# Patient Record
Sex: MALE | Race: WHITE
[De-identification: names, ages, dates, MRNs, and addresses within clinical notes are randomized per-mention and may not be internally consistent; named-entity substitution may affect disease eponyms.]

---

## 2018-04-19 ENCOUNTER — HOSPITAL ENCOUNTER (OUTPATIENT)
Dept: HOSPITAL 80 - FIMAGING | Age: 83
End: 2018-04-19
Attending: INTERNAL MEDICINE
Payer: COMMERCIAL

## 2018-04-19 DIAGNOSIS — Z57.9: Primary | ICD-10-CM

## 2018-04-19 DIAGNOSIS — Z87.891: ICD-10-CM

## 2018-04-19 SDOH — HEALTH STABILITY - PHYSICAL HEALTH: OCCUPATIONAL EXPOSURE TO UNSPECIFIED RISK FACTOR: Z57.9

## 2019-01-21 ENCOUNTER — HOSPITAL ENCOUNTER (EMERGENCY)
Dept: HOSPITAL 80 - FED | Age: 84
Discharge: HOME | End: 2019-01-21
Payer: COMMERCIAL

## 2019-01-21 VITALS — SYSTOLIC BLOOD PRESSURE: 146 MMHG | DIASTOLIC BLOOD PRESSURE: 100 MMHG
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
## 2019-01-21 DIAGNOSIS — Z79.899: ICD-10-CM

## 2019-01-21 DIAGNOSIS — J44.9: ICD-10-CM

## 2019-01-21 DIAGNOSIS — I48.91: ICD-10-CM

## 2019-01-21 DIAGNOSIS — S22.41XA: Primary | ICD-10-CM

## 2019-01-21 DIAGNOSIS — Z99.81: ICD-10-CM

## 2019-01-21 NOTE — EDPHY
H & P


Stated Complaint: fall 1/19 R Rib pain


Time Seen by Provider: 01/21/19 14:43





- Personal History


Tetanus Vaccine Date: 2010





- Medical/Surgical History


Hx Asthma: No


Hx Chronic Respiratory Disease: Yes


Hx Diabetes: No


Hx Cardiac Disease: Yes


Hx Renal Disease: No


Hx Cirrhosis: No


Hx Alcoholism: No


Hx HIV/AIDS: No


Hx Splenectomy or Spleen Trauma: No


Other PMH: Afib, COPD, Prostate CA, bilateral hearing aids, kidney stone, 

chronic home oxygen, anti-coagulated with Coumadin.





- Social History


Smoking Status: Former smoker


Constitutional: 


 Initial Vital Signs











Temperature (C)  36.8 C   01/21/19 14:31


 


Heart Rate  92   01/21/19 14:31


 


Respiratory Rate  16   01/21/19 14:31


 


Blood Pressure  146/100 H  01/21/19 14:31


 


O2 Sat (%)  93   01/21/19 14:31








 











O2 Delivery Mode               Room Air














Allergies/Adverse Reactions: 


 





No Known Allergies Allergy (Verified 01/21/19 14:31)


 








Home Medications: 














 Medication  Instructions  Recorded


 


Aspirin [Aspirin 81mg (*)] 162 mg PO DAILY 12/28/11


 


Diltiazem [Cardizem] 120 mg PO TID 12/28/11


 


Tamsulosin HCl [Flomax 0.4 MG (*)] 0.4 mg PO HS 12/28/11


 


Levalbuterol Tartrate [Xopenex Hfa] 2 puffs IH QID PRN 01/10/13


 


Calcium Carb W/Vit D [Calcium Carb 500 mg PO DAILY 03/02/15





W/Vit D 500/200 (*)]  


 


Cetirizine [ZyrTEC 10 mg (*)] 10 mg PO DAILY 03/02/15


 


Glucosamine/Chondroitin 1 each PO TID 03/02/15





[Glucosamine/Chondroitin (*)]  


 


Warfarin Sodium [Coumadin 2.5MG 2.5 mg PO FR@21 03/02/15





(*)]  


 


Warfarin Sodium [Coumadin 5MG (*)] 5 mg PO SUMOTUWETHSA@21 03/02/15


 


Levalbuterol 1.25 mg [Xopenex 1.25 mg IH Q6 PRN 09/14/15





1.25MG Neb (*)]  


 


Diltiazem Cd [Cardizem   mg PO BID #0 cap 09/15/15





(*)]  


 


predniSONE [prednisone 20mg (RX)] 2 tab PO DAILY #10 tab 05/04/16


 


HYDROcodone/APAP 10/325 [Norco 1 - 2 each PO Q4-6PRN PRN #20 tab 01/21/19





10/325]  


 


Ibuprofen [Motrin] 800 mg PO Q8 #20 tab 01/21/19














Medical Decision Making





- Diagnostics


Imaging Results: 


 Imaging Impressions





Ribs w/Chest X-Ray  01/21/19 14:52


Impression: Right Seventh rib fracture. Possible adjacent sixth rib fracture as 

well.


 


 











Imaging: I viewed and interpreted images myself


ED Course/Re-evaluation: 





CHIEF COMPLAINT:  Right anterior chest pain





HISTORY OF PRESENT ILLNESS:  83-year-old gentleman who 3 days ago fell while 

picking something up.  It was a mechanical fall.  He has significant COPD and 

had his albuterol meter dose inhaler in his right breast pocket.  He fell on 

top of the MDI and felt severe rib pain in that spot.  He thought the pain 

would resolve however it has worsened slightly.  With his COPD does not feel 

like he is taking good breaths.  His doctor's office encouraged him to come 

here for further evaluation.  He is here with his wife.  He does not have any 

specific shortness of breath just painful breathing





REVIEW OF SYSTEMS:  





A comprehensive 10 system review of systems is otherwise negative aside from 

elements mentioned in the history of present illness and medical decision 

making.





PHYSICAL EXAM:  





HR, BP, O2 Sat, RR.  Temp noted


General Appearance:  Alert, well hydrated, appropriate, and non-toxic appearing.


Head:  Atraumatic without scalp tenderness or obvious injury


Eyes:  Pupils equal, round, reactive to light and accommodation, EOMI, no trauma

, no injection.


Ears:  Clear bilaterally, no perforation, normal landmarks


Nose:  Atraumatic, no rhinorrhea, clear.


Throat:  There is no erythema or exudates, no lesions, normal tonsils, mucus 

membranes moist.


Neck:  Supple, 2+ carotid upstroke, nontender, no lymphadenopathy.


Respiratory:  Distinct pain over the right anterior chest near the breast 

pocket about the size of a hockey puck.  No retractions, no distress, no wheezes

, and no accessory muscle use.  Lungs are clear to auscultation bilaterally.


Cardiovascular:  Regular rate and rhythm, no murmurs, rubs, or gallops. 

Bilateral carotid, radial, dorsalis pedis, and posterior tibial pulses intact. 

Good capillary refill all extremities.


Gastrointestinal:  Abdomen is soft, nontender, non-distended, no masses, no 

rebound, no guarding, no peritoneal signs.


Musculoskeletal:  Normal active ROM of all extremities, atraumatic.


Neurological:  Alert, appropriate, and interactive.  The patient has normal 

DTRs and non-focal cranial nerves, motor, sensory, and cerebellar exam.


Skin:  No rashes, good turgor, no nodules on palpation.





Past medical history:  Severe COPD on multiple puffers throughout the day.  O2 

dependent at night


Past surgical history:  Noncontributory


Family history:  Noncontributory


Social history:  , employed, does not abuse drugs or alcohol.





DIAGNOSTICS/PROCEDURES/CRITICAL CARE TIME:  


Study:  PA Chest X-ray and ribs





Indication:  Trauma


Results:  After viewing the images myself on the PACS system.  My 

interpretation of the images is:  right rib fractures, no infiltrate.  The 

radiologist interpretation is pending at the time of this dictation. I have 

discussed the above x-rays with the radiologist.





DIFFERENTIAL DIAGNOSIS:   The differential diagnosis for the patient's chest 

pain included but was not limited to myocardial ischemia, pulmonary embolus, 

chest wall pain, pleural inflammation, trauma, and pulmonary infectious causes.





MEDICAL DECISION MAKING:  This patient is not hypoxemic.  This patient has 

point tenderness over the area where he landed on his meter dose inhaler.  I am 

performing rib and chest x-rays.  





Chest x-ray shows right seventh rib fracture, possibly 6th rib fracture. 

Reassessed patient and discussed findings. He will be discharged with scripts 

for Norco and ibuprofen along with incentive spirometer and standard rib 

fracture care and follow up instructions. Return precautions discussed. 





Departure





- Departure


Disposition: Home, Routine, Self-Care


Clinical Impression: 


Rib fractures


Qualifiers:


 Encounter type: initial encounter Rib fracture type: multiple ribs Fracture 

type: closed Laterality: right Qualified Code(s): S22.41XA - Multiple fractures 

of ribs, right side, initial encounter for closed fracture





Condition: Good


Instructions:  Hydrocodone/Acetaminophen (By mouth), How to Use an Incentive 

Spirometer (ED), Rib Fracture (ED)


Additional Instructions: 


1. Use 600mg ibuprofen every 8 hours for pain and inflammation for the next few 

days. 


2. Take Norco as prescribed when needed for severe pain. This medication can 

make you drowsy and constipated. Do not use prior to driving. 


3. Use incentive spirometer as directed to ensure you are taking deep enough 

breaths regularly to prevent pneumonia.


4. Follow up with your primary care provider as needed. 


5. Return to the ED for worsening of condition. 


Referrals: 


Princess Navarro MD [Primary Care Provider] - As per Instructions


Prescriptions: 


HYDROcodone/APAP 10/325 [Norco 10/325] 1 - 2 each PO Q4-6PRN PRN #20 tab


 PRN Reason: Pain, Moderate


Ibuprofen [Motrin] 800 mg PO Q8 #20 tab